# Patient Record
Sex: FEMALE | NOT HISPANIC OR LATINO | Employment: OTHER | URBAN - METROPOLITAN AREA
[De-identification: names, ages, dates, MRNs, and addresses within clinical notes are randomized per-mention and may not be internally consistent; named-entity substitution may affect disease eponyms.]

---

## 2023-06-21 ENCOUNTER — OFFICE VISIT (OUTPATIENT)
Dept: PRIMARY CARE | Facility: CLINIC | Age: 33
End: 2023-06-21
Payer: COMMERCIAL

## 2023-06-21 VITALS
HEIGHT: 65 IN | SYSTOLIC BLOOD PRESSURE: 105 MMHG | HEART RATE: 85 BPM | DIASTOLIC BLOOD PRESSURE: 67 MMHG | WEIGHT: 163.38 LBS | OXYGEN SATURATION: 99 % | BODY MASS INDEX: 27.22 KG/M2

## 2023-06-21 DIAGNOSIS — F41.9 ANXIETY: ICD-10-CM

## 2023-06-21 DIAGNOSIS — Z3A.01 LESS THAN 8 WEEKS GESTATION OF PREGNANCY (HHS-HCC): Primary | ICD-10-CM

## 2023-06-21 PROBLEM — N94.3 PREMENSTRUAL TENSION SYNDROME: Status: ACTIVE | Noted: 2023-06-21

## 2023-06-21 PROBLEM — U07.1 COVID-19: Status: RESOLVED | Noted: 2023-06-21 | Resolved: 2023-06-21

## 2023-06-21 PROCEDURE — 1036F TOBACCO NON-USER: CPT | Performed by: FAMILY MEDICINE

## 2023-06-21 PROCEDURE — 99213 OFFICE O/P EST LOW 20 MIN: CPT | Performed by: FAMILY MEDICINE

## 2023-06-21 RX ORDER — SERTRALINE HYDROCHLORIDE 50 MG/1
1 TABLET, FILM COATED ORAL DAILY
COMMUNITY
Start: 2017-07-17

## 2023-06-21 NOTE — PROGRESS NOTES
"Subjective   Patient ID: Virginia Serrano is a 33 y.o. female who presents for Discuss Sertraline . States she is expecting her very first baby; LMP was 5/12/23. Pt has taken two home tests thus far both positive. States she read up on the Sertraline and it is not safe to take while pregnant. Tried taking herself off but felt terrible; has not taken it since Saturday but only took 25 mg.     Nausea and fatigue   LMP 5/12     Sertraline stopped on Sat,    Feels ok off it  takes periodically   Anxiety mostly            Review of Systems    Objective   Ht 1.645 m (5' 4.75\")   Wt 74.1 kg (163 lb 6 oz)   BMI 27.40 kg/m²     Physical Exam  Constitutional:       Appearance: Normal appearance. She is well-developed.   Cardiovascular:      Rate and Rhythm: Normal rate and regular rhythm.      Heart sounds: Normal heart sounds. No murmur heard.  Pulmonary:      Effort: Pulmonary effort is normal.      Breath sounds: Normal breath sounds.   Neurological:      General: No focal deficit present.      Mental Status: She is alert.         Assessment/Plan   Problem List Items Addressed This Visit    None         "

## 2023-06-21 NOTE — PATIENT INSTRUCTIONS
I discussed the risks and benefits of the medication with patient.  Please call the office if you are having problems with medications.     Safety profiles on that SSRIs generally are felt to be pretty safe with pregnancy  Sertraline, Prozac have the most data, also Lexapro    Depends upon the severity of your symptoms if you need to continue the medication there is not any evidence of significant effect on the fetus  If you are able to get off the medication you can continue to wean off of that and see how you do mood wise      Recommend daily prenatal vitamins  : START NOW   Make an appointment for follow-up with OB    Follow-up here with any non   Pregnancy related illnesses    Discussed routine pregnancy care, regular prenatal vitamins, avoid alcohol, tobacco  Minimize caffeine usage

## 2024-06-19 ENCOUNTER — LAB (OUTPATIENT)
Dept: LAB | Facility: LAB | Age: 34
End: 2024-06-19
Payer: COMMERCIAL

## 2024-06-19 ENCOUNTER — APPOINTMENT (OUTPATIENT)
Dept: PRIMARY CARE | Facility: CLINIC | Age: 34
End: 2024-06-19
Payer: COMMERCIAL

## 2024-06-19 VITALS
HEART RATE: 83 BPM | SYSTOLIC BLOOD PRESSURE: 106 MMHG | DIASTOLIC BLOOD PRESSURE: 69 MMHG | WEIGHT: 153 LBS | HEIGHT: 65 IN | BODY MASS INDEX: 25.49 KG/M2 | OXYGEN SATURATION: 98 %

## 2024-06-19 DIAGNOSIS — F41.9 ANXIETY: ICD-10-CM

## 2024-06-19 DIAGNOSIS — F41.9 ANXIETY: Primary | ICD-10-CM

## 2024-06-19 PROBLEM — K21.9 GERD (GASTROESOPHAGEAL REFLUX DISEASE): Status: ACTIVE | Noted: 2024-02-17

## 2024-06-19 PROBLEM — F32.A DEPRESSION: Status: ACTIVE | Noted: 2024-02-17

## 2024-06-19 PROBLEM — O42.90 LEAKAGE OF AMNIOTIC FLUID (HHS-HCC): Status: ACTIVE | Noted: 2024-02-17

## 2024-06-19 LAB
ERYTHROCYTE [DISTWIDTH] IN BLOOD BY AUTOMATED COUNT: 14.6 % (ref 11.5–14.5)
HCT VFR BLD AUTO: 41.9 % (ref 36–46)
HGB BLD-MCNC: 13.4 G/DL (ref 12–16)
MCH RBC QN AUTO: 27.6 PG (ref 26–34)
MCHC RBC AUTO-ENTMCNC: 32 G/DL (ref 32–36)
MCV RBC AUTO: 86 FL (ref 80–100)
NRBC BLD-RTO: 0 /100 WBCS (ref 0–0)
PLATELET # BLD AUTO: 291 X10*3/UL (ref 150–450)
RBC # BLD AUTO: 4.86 X10*6/UL (ref 4–5.2)
TSH SERPL-ACNC: 0.64 MIU/L (ref 0.44–3.98)
WBC # BLD AUTO: 7 X10*3/UL (ref 4.4–11.3)

## 2024-06-19 PROCEDURE — 84443 ASSAY THYROID STIM HORMONE: CPT

## 2024-06-19 PROCEDURE — 99214 OFFICE O/P EST MOD 30 MIN: CPT | Performed by: FAMILY MEDICINE

## 2024-06-19 PROCEDURE — 1036F TOBACCO NON-USER: CPT | Performed by: FAMILY MEDICINE

## 2024-06-19 PROCEDURE — 36415 COLL VENOUS BLD VENIPUNCTURE: CPT

## 2024-06-19 PROCEDURE — 85027 COMPLETE CBC AUTOMATED: CPT

## 2024-06-19 RX ORDER — IBUPROFEN 600 MG/1
600 TABLET ORAL EVERY 6 HOURS PRN
COMMUNITY
Start: 2024-02-19

## 2024-06-19 RX ORDER — ACETAMINOPHEN 500 MG
TABLET ORAL DAILY
COMMUNITY

## 2024-06-19 RX ORDER — SERTRALINE HYDROCHLORIDE 25 MG/1
25 TABLET, FILM COATED ORAL DAILY
Qty: 90 TABLET | Refills: 2 | Status: SHIPPED | OUTPATIENT
Start: 2024-06-19

## 2024-06-19 ASSESSMENT — ANXIETY QUESTIONNAIRES
3. WORRYING TOO MUCH ABOUT DIFFERENT THINGS: MORE THAN HALF THE DAYS
5. BEING SO RESTLESS THAT IT IS HARD TO SIT STILL: NOT AT ALL
GAD7 TOTAL SCORE: 10
6. BECOMING EASILY ANNOYED OR IRRITABLE: SEVERAL DAYS
1. FEELING NERVOUS, ANXIOUS, OR ON EDGE: MORE THAN HALF THE DAYS
4. TROUBLE RELAXING: SEVERAL DAYS
2. NOT BEING ABLE TO STOP OR CONTROL WORRYING: MORE THAN HALF THE DAYS
7. FEELING AFRAID AS IF SOMETHING AWFUL MIGHT HAPPEN: MORE THAN HALF THE DAYS
IF YOU CHECKED OFF ANY PROBLEMS ON THIS QUESTIONNAIRE, HOW DIFFICULT HAVE THESE PROBLEMS MADE IT FOR YOU TO DO YOUR WORK, TAKE CARE OF THINGS AT HOME, OR GET ALONG WITH OTHER PEOPLE: SOMEWHAT DIFFICULT

## 2024-06-19 ASSESSMENT — PATIENT HEALTH QUESTIONNAIRE - PHQ9
1. LITTLE INTEREST OR PLEASURE IN DOING THINGS: NOT AT ALL
SUM OF ALL RESPONSES TO PHQ9 QUESTIONS 1 AND 2: 1
10. IF YOU CHECKED OFF ANY PROBLEMS, HOW DIFFICULT HAVE THESE PROBLEMS MADE IT FOR YOU TO DO YOUR WORK, TAKE CARE OF THINGS AT HOME, OR GET ALONG WITH OTHER PEOPLE: NOT DIFFICULT AT ALL
2. FEELING DOWN, DEPRESSED OR HOPELESS: SEVERAL DAYS

## 2024-06-19 NOTE — PROGRESS NOTES
"Subjective   Patient ID: Virginia Serrano is a 34 y.o. female who presents for Follow-up. 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       Anxiety: Currently stable with anxiety, minimal symptoms.  Stable on current medications, helping with    symptoms  Slight depression  after anxiety ,  sertraline worked well in past     Has a 4 month ago    Nursing       Energy level ok   No period yet          Review of Systems    Objective   /69   Pulse 83   Ht 1.645 m (5' 4.75\")   Wt 69.4 kg (153 lb)   LMP 05/12/2023 (Exact Date)   SpO2 98%   Breastfeeding Yes   BMI 25.66 kg/m²     Physical Exam  Constitutional:       Appearance: Normal appearance. She is well-developed.   Cardiovascular:      Rate and Rhythm: Normal rate and regular rhythm.      Heart sounds: Normal heart sounds. No murmur heard.  Pulmonary:      Effort: Pulmonary effort is normal.      Breath sounds: Normal breath sounds.   Neurological:      General: No focal deficit present.      Mental Status: She is alert.         Assessment/Plan   Problem List Items Addressed This Visit             ICD-10-CM    Anxiety - Primary F41.9    Relevant Medications    sertraline (Zoloft) 25 mg tablet    Other Relevant Orders    CBC    Thyroid Stimulating Hormone          "

## 2024-06-19 NOTE — PATIENT INSTRUCTIONS
Anxiety :  For your anxiety is important that you do activities that contribute to relaxation.  Regular exercise and adequate sleep are very important.  Counseling can be beneficial as well.  If you are  on medications for anxiety is important that you take them as directed and let your physician know if you continue to have symptoms or if your symptoms worsen.  It is also important that you be seen in the office on a regular basis at least every 6 months.

## 2024-06-20 ENCOUNTER — TELEPHONE (OUTPATIENT)
Dept: PRIMARY CARE | Facility: CLINIC | Age: 34
End: 2024-06-20
Payer: COMMERCIAL

## 2024-06-20 NOTE — TELEPHONE ENCOUNTER
Result Communication    Resulted Orders   CBC   Result Value Ref Range    WBC 7.0 4.4 - 11.3 x10*3/uL    nRBC 0.0 0.0 - 0.0 /100 WBCs    RBC 4.86 4.00 - 5.20 x10*6/uL    Hemoglobin 13.4 12.0 - 16.0 g/dL    Hematocrit 41.9 36.0 - 46.0 %    MCV 86 80 - 100 fL    MCH 27.6 26.0 - 34.0 pg    MCHC 32.0 32.0 - 36.0 g/dL    RDW 14.6 (H) 11.5 - 14.5 %    Platelets 291 150 - 450 x10*3/uL   Thyroid Stimulating Hormone   Result Value Ref Range    Thyroid Stimulating Hormone 0.64 0.44 - 3.98 mIU/L    Narrative    TSH testing is performed using different testing methodology at [facility] than at other [facility] hospitals. Direct result comparisons should only be made within the same method.         11:39 AM      Results were successfully communicated with the patient and they acknowledged their understanding.

## 2025-04-30 DIAGNOSIS — F41.9 ANXIETY: ICD-10-CM

## 2025-05-20 DIAGNOSIS — F41.9 ANXIETY: ICD-10-CM

## 2025-05-20 RX ORDER — SERTRALINE HYDROCHLORIDE 25 MG/1
25 TABLET, FILM COATED ORAL DAILY
Qty: 90 TABLET | Refills: 0 | Status: SHIPPED | OUTPATIENT
Start: 2025-05-20

## 2025-05-20 RX ORDER — SERTRALINE HYDROCHLORIDE 25 MG/1
25 TABLET, FILM COATED ORAL DAILY
Qty: 90 TABLET | Refills: 2 | OUTPATIENT
Start: 2025-05-20

## 2025-05-30 PROBLEM — Z85.9 HISTORY OF MALIGNANT NEOPLASM: Status: ACTIVE | Noted: 2025-05-30

## 2025-05-30 PROBLEM — Z86.39 HISTORY OF DIABETES MELLITUS: Status: ACTIVE | Noted: 2025-05-30

## 2025-05-30 PROBLEM — K82.9 DISORDER OF GALLBLADDER: Status: ACTIVE | Noted: 2025-05-30

## 2025-05-30 PROBLEM — O09.891 SHORT INTERVAL BETWEEN PREGNANCIES AFFECTING PREGNANCY IN FIRST TRIMESTER, ANTEPARTUM (HHS-HCC): Status: ACTIVE | Noted: 2025-03-20

## 2025-05-30 PROBLEM — F32.A DEPRESSION AFFECTING PREGNANCY (HHS-HCC): Status: ACTIVE | Noted: 2025-03-20

## 2025-05-30 PROBLEM — N71.9 ENDOMETRITIS: Status: ACTIVE | Noted: 2025-04-30

## 2025-05-30 PROBLEM — R51.9 GENERALIZED HEADACHE: Status: ACTIVE | Noted: 2025-05-30

## 2025-05-30 PROBLEM — O09.91 SUPERVISION OF HIGH RISK PREGNANCY IN FIRST TRIMESTER (HHS-HCC): Status: ACTIVE | Noted: 2025-03-20

## 2025-05-30 PROBLEM — O09.291: Status: ACTIVE | Noted: 2025-03-20

## 2025-05-30 PROBLEM — O99.340 DEPRESSION AFFECTING PREGNANCY (HHS-HCC): Status: ACTIVE | Noted: 2025-03-20

## 2025-05-30 PROBLEM — O02.1 MISSED ABORTION (HHS-HCC): Status: ACTIVE | Noted: 2025-04-30

## 2025-05-30 PROBLEM — Z86.59 HISTORY OF DEPRESSION: Status: ACTIVE | Noted: 2025-05-30

## 2025-05-30 RX ORDER — ONDANSETRON 4 MG/1
4 TABLET, FILM COATED ORAL EVERY 8 HOURS PRN
COMMUNITY
Start: 2025-04-23 | End: 2025-06-04 | Stop reason: ALTCHOICE

## 2025-05-30 RX ORDER — FAMOTIDINE 20 MG/1
20 TABLET, FILM COATED ORAL 2 TIMES DAILY
COMMUNITY
Start: 2025-03-20 | End: 2025-06-04 | Stop reason: ALTCHOICE

## 2025-05-30 RX ORDER — ASPIRIN 81 MG/1
1 TABLET ORAL
COMMUNITY
Start: 2025-03-20 | End: 2025-06-04 | Stop reason: ALTCHOICE

## 2025-06-04 ENCOUNTER — APPOINTMENT (OUTPATIENT)
Dept: PRIMARY CARE | Facility: CLINIC | Age: 35
End: 2025-06-04
Payer: COMMERCIAL

## 2025-06-04 VITALS
BODY MASS INDEX: 25.16 KG/M2 | HEART RATE: 80 BPM | HEIGHT: 65 IN | DIASTOLIC BLOOD PRESSURE: 78 MMHG | OXYGEN SATURATION: 97 % | SYSTOLIC BLOOD PRESSURE: 117 MMHG | WEIGHT: 151 LBS

## 2025-06-04 DIAGNOSIS — F32.0 CURRENT MILD EPISODE OF MAJOR DEPRESSIVE DISORDER WITHOUT PRIOR EPISODE: ICD-10-CM

## 2025-06-04 DIAGNOSIS — Z13.220 LIPID SCREENING: ICD-10-CM

## 2025-06-04 DIAGNOSIS — F41.9 ANXIETY: Primary | ICD-10-CM

## 2025-06-04 PROBLEM — O09.891 SHORT INTERVAL BETWEEN PREGNANCIES AFFECTING PREGNANCY IN FIRST TRIMESTER, ANTEPARTUM (HHS-HCC): Status: RESOLVED | Noted: 2025-03-20 | Resolved: 2025-06-04

## 2025-06-04 PROBLEM — O42.90 LEAKAGE OF AMNIOTIC FLUID (HHS-HCC): Status: RESOLVED | Noted: 2024-02-17 | Resolved: 2025-06-04

## 2025-06-04 PROBLEM — O02.1 MISSED ABORTION (HHS-HCC): Status: RESOLVED | Noted: 2025-04-30 | Resolved: 2025-06-04

## 2025-06-04 PROBLEM — O99.340 DEPRESSION AFFECTING PREGNANCY (HHS-HCC): Status: RESOLVED | Noted: 2025-03-20 | Resolved: 2025-06-04

## 2025-06-04 PROBLEM — F32.A DEPRESSION AFFECTING PREGNANCY (HHS-HCC): Status: RESOLVED | Noted: 2025-03-20 | Resolved: 2025-06-04

## 2025-06-04 PROBLEM — O09.91 SUPERVISION OF HIGH RISK PREGNANCY IN FIRST TRIMESTER (HHS-HCC): Status: RESOLVED | Noted: 2025-03-20 | Resolved: 2025-06-04

## 2025-06-04 PROCEDURE — 99213 OFFICE O/P EST LOW 20 MIN: CPT | Performed by: FAMILY MEDICINE

## 2025-06-04 PROCEDURE — 3008F BODY MASS INDEX DOCD: CPT | Performed by: FAMILY MEDICINE

## 2025-06-04 PROCEDURE — 1036F TOBACCO NON-USER: CPT | Performed by: FAMILY MEDICINE

## 2025-06-04 RX ORDER — SERTRALINE HYDROCHLORIDE 50 MG/1
50 TABLET, FILM COATED ORAL DAILY
Qty: 100 TABLET | Refills: 3 | Status: SHIPPED | OUTPATIENT
Start: 2025-06-04

## 2025-06-04 ASSESSMENT — PATIENT HEALTH QUESTIONNAIRE - PHQ9
1. LITTLE INTEREST OR PLEASURE IN DOING THINGS: NOT AT ALL
2. FEELING DOWN, DEPRESSED OR HOPELESS: NOT AT ALL
SUM OF ALL RESPONSES TO PHQ9 QUESTIONS 1 AND 2: 0

## 2025-06-04 NOTE — PROGRESS NOTES
"Subjective   Patient ID: Virginia Serrano is a 35 y.o. female who presents for Follow-up. Doing well on the Zoloft.     Anxiety and depression:  Recently did have a miscarriage which is making her symptoms little bit worse  On sertraline 25 mg   Working but not well enough, feels like she should go up on the dose  No side effects            Review of Systems    Objective   /78   Pulse 80   Ht 1.645 m (5' 4.75\")   Wt 68.5 kg (151 lb)   SpO2 97%   Breastfeeding No   BMI 25.32 kg/m²     Physical Exam  Constitutional:       Appearance: Normal appearance. She is well-developed.   Cardiovascular:      Rate and Rhythm: Normal rate and regular rhythm.      Heart sounds: Normal heart sounds. No murmur heard.  Pulmonary:      Effort: Pulmonary effort is normal.      Breath sounds: Normal breath sounds.   Neurological:      General: No focal deficit present.      Mental Status: She is alert.   Psychiatric:         Mood and Affect: Mood normal.      Comments: A little anxious         Assessment/Plan   Problem List Items Addressed This Visit           ICD-10-CM    Anxiety - Primary F41.9    Relevant Medications    sertraline (Zoloft) 50 mg tablet    Other Relevant Orders    CBC and Auto Differential    Comprehensive Metabolic Panel    Thyroid Stimulating Hormone    Lipid Panel    Depression F32.A    Relevant Orders    CBC and Auto Differential    Comprehensive Metabolic Panel    Thyroid Stimulating Hormone    Lipid Panel     Other Visit Diagnoses         Codes      Lipid screening     Z13.220    Relevant Orders    CBC and Auto Differential    Comprehensive Metabolic Panel    Thyroid Stimulating Hormone    Lipid Panel               "

## 2025-06-04 NOTE — PATIENT INSTRUCTIONS
Get your blood work as ordered.  You should hear from our office with results whether they are normal are not within a few days.  Please call the office if you do not hear from us.       Increase the sertraline to 50 mg     You should be getting cardiovascular exercise 3-5 times per week for 30-45 minutes.  This includes exercises such as running, brisk walking, biking or swimming.

## 2025-08-21 ENCOUNTER — TELEPHONE (OUTPATIENT)
Dept: PRIMARY CARE | Facility: CLINIC | Age: 35
End: 2025-08-21
Payer: COMMERCIAL

## 2025-08-21 DIAGNOSIS — F41.9 ANXIETY: ICD-10-CM

## 2025-08-29 RX ORDER — SERTRALINE HYDROCHLORIDE 25 MG/1
25 TABLET, FILM COATED ORAL DAILY
Qty: 90 TABLET | Refills: 2 | Status: SHIPPED | OUTPATIENT
Start: 2025-08-29